# Patient Record
Sex: FEMALE | Race: WHITE | Employment: OTHER | URBAN - METROPOLITAN AREA
[De-identification: names, ages, dates, MRNs, and addresses within clinical notes are randomized per-mention and may not be internally consistent; named-entity substitution may affect disease eponyms.]

---

## 2017-01-16 ENCOUNTER — TRANSFERRED RECORDS (OUTPATIENT)
Dept: PHYSICAL THERAPY | Facility: CLINIC | Age: 67
End: 2017-01-16

## 2017-10-19 ENCOUNTER — TRANSFERRED RECORDS (OUTPATIENT)
Dept: PHYSICAL THERAPY | Facility: CLINIC | Age: 67
End: 2017-10-19

## 2017-11-06 ENCOUNTER — THERAPY VISIT (OUTPATIENT)
Dept: PHYSICAL THERAPY | Facility: CLINIC | Age: 67
End: 2017-11-06
Payer: COMMERCIAL

## 2017-11-06 DIAGNOSIS — N39.46 MIXED INCONTINENCE: ICD-10-CM

## 2017-11-06 DIAGNOSIS — M99.05 SOMATIC DYSFUNCTION OF PELVIS REGION: Primary | ICD-10-CM

## 2017-11-06 DIAGNOSIS — N95.2 ATROPHIC VAGINITIS: ICD-10-CM

## 2017-11-06 PROBLEM — N94.10: Status: ACTIVE | Noted: 2017-11-06

## 2017-11-06 PROBLEM — N94.10: Status: RESOLVED | Noted: 2017-11-06 | Resolved: 2017-11-06

## 2017-11-06 PROCEDURE — 97110 THERAPEUTIC EXERCISES: CPT | Mod: GP | Performed by: PHYSICAL THERAPIST

## 2017-11-06 PROCEDURE — 97535 SELF CARE MNGMENT TRAINING: CPT | Mod: GP | Performed by: PHYSICAL THERAPIST

## 2017-11-06 PROCEDURE — 97112 NEUROMUSCULAR REEDUCATION: CPT | Mod: GP | Performed by: PHYSICAL THERAPIST

## 2017-11-06 PROCEDURE — 97161 PT EVAL LOW COMPLEX 20 MIN: CPT | Mod: GP | Performed by: PHYSICAL THERAPIST
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
## 2017-11-06 NOTE — MR AVS SNAPSHOT
After Visit Summary   11/6/2017    Monica Edmonds    MRN: [id number]           Patient Information     Date Of Birth          1950        Visit Information        Provider Department      11/6/2017 8:50 AM Anat Cherry, PT [facility]        Today's Diagnoses     Mixed incontinence    -  1    Female dyspareunia due to non-psychogenic cause           Follow-ups after your visit        Your next 10 appointments already scheduled     Nov 13, 2017  9:30 AM CST   [facility] For Women Only with Anat Cherry, PT   [facility] ([facility] [place]  )    [address]  [facility] [address]   [phone]            Nov 20, 2017  9:30 AM CST   [facility] For Women Only with Anat Cherry, PT   [facility] ([facility] [place]  )    [address]  [facility] [address]   [phone]              Who to contact     If you have questions or need follow up information about today's clinic visit or your schedule please contact [facility] THERAP[facility] directly at [phone].  Normal or non-critical lab and imaging results will be communicated to you by Nusirthart, letter or phone within 4 business days after the clinic has received the results. If you do not hear from us within 7 days, please contact the clinic through Nusirthart or phone. If you have a critical or abnormal lab result, we will notify you by phone as soon as possible.  Submit refill requests through Beatsy or call your pharmacy and they will forward the refill request to us. Please allow 3 business days for your refill to be completed.          Additional Information About Your Visit        [employer] Information     Beatsy lets you send messages to your doctor, view your test results, renew your prescriptions, schedule appointments and more. To sign up, go to  [email or web address] . Click on \"Log in\" on the left side of the screen, which will take you to the Welcome page. Then click on \"Sign up Now\" on the right side of the page.     You will be asked to enter the access code listed below, as well as some personal information. Please follow the directions to create your username and password.     Your access code is: [email or web address]  Expires: 2018 11:32 AM     Your access code will  in 90 days. If you need help or a new code, please call your [place] clinic or [phone].        Care EveryWhere ID     This is your Care EveryWhere ID. This could be used by other organizations to access your [place] medical records  [id number]         Blood Pressure from Last 3 Encounters:   No data found for BP    Weight from Last 3 Encounters:   No data found for Wt              We Performed the Following     JUAN ALBERTO Inital Eval Report     Neuromuscular Re-Education     PT Eval, Low Complexity (32163)     Self Care Management Training     Therapeutic Exercises        Primary Care Provider Office Phone # Fax #    Rama Gauthier [phone]       [facility] [address] [facility] [address]        Equal Access to Services     ENZO JAEGER : Hadii aad ku hadasho Soomaali, waaxda luqadaha, qaybta kaalmada adeegyada, waxay idiin hayeladian aidan kraft. So [facility] [phone].    ATENCIÓN: Si habla español, tiene a miranda disposición servicios gratuitos de asistencia lingüística. Llame al [phone].    We comply with applicable federal civil rights laws and [place] laws. We do not discriminate on the basis of race, color, national origin, age, disability, sex, sexual orientation, or gender identity.            Thank you!     Thank you for choosing [facility]  for your care. Our goal is always to provide you with excellent care. Hearing back from our patients is one way we can continue " to improve our services. Please take a few minutes to complete the written survey that you may receive in the mail after your visit with us. Thank you!             Your Updated Medication List - Protect others around you: Learn how to safely use, store and throw away your medicines at www.disposemymeds.org.      Notice  As of 11/6/2017 11:32 AM    You have not been prescribed any medications.
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
## 2017-11-13 ENCOUNTER — THERAPY VISIT (OUTPATIENT)
Dept: PHYSICAL THERAPY | Facility: CLINIC | Age: 67
End: 2017-11-13
Payer: COMMERCIAL

## 2017-11-13 DIAGNOSIS — M99.05 SOMATIC DYSFUNCTION OF PELVIS REGION: ICD-10-CM

## 2017-11-13 DIAGNOSIS — N95.2 ATROPHIC VAGINITIS: ICD-10-CM

## 2017-11-13 DIAGNOSIS — N39.46 MIXED INCONTINENCE: ICD-10-CM

## 2017-11-13 PROCEDURE — 97535 SELF CARE MNGMENT TRAINING: CPT | Mod: GP | Performed by: PHYSICAL THERAPIST

## 2017-11-13 PROCEDURE — 97110 THERAPEUTIC EXERCISES: CPT | Mod: GP | Performed by: PHYSICAL THERAPIST

## 2017-11-13 PROCEDURE — 97112 NEUROMUSCULAR REEDUCATION: CPT | Mod: GP | Performed by: PHYSICAL THERAPIST

## 2017-11-20 ENCOUNTER — THERAPY VISIT (OUTPATIENT)
Dept: PHYSICAL THERAPY | Facility: CLINIC | Age: 67
End: 2017-11-20
Payer: COMMERCIAL

## 2017-11-20 DIAGNOSIS — N95.2 ATROPHIC VAGINITIS: ICD-10-CM

## 2017-11-20 DIAGNOSIS — M99.05 SOMATIC DYSFUNCTION OF PELVIS REGION: ICD-10-CM

## 2017-11-20 DIAGNOSIS — N39.46 MIXED INCONTINENCE: ICD-10-CM

## 2017-11-20 PROCEDURE — 97112 NEUROMUSCULAR REEDUCATION: CPT | Mod: GP | Performed by: PHYSICAL THERAPIST

## 2017-11-20 PROCEDURE — 97110 THERAPEUTIC EXERCISES: CPT | Mod: GP | Performed by: PHYSICAL THERAPIST

## 2017-12-15 ENCOUNTER — THERAPY VISIT (OUTPATIENT)
Dept: PHYSICAL THERAPY | Facility: CLINIC | Age: 67
End: 2017-12-15
Payer: COMMERCIAL

## 2017-12-15 DIAGNOSIS — M99.05 SOMATIC DYSFUNCTION OF PELVIS REGION: ICD-10-CM

## 2017-12-15 DIAGNOSIS — N95.2 ATROPHIC VAGINITIS: ICD-10-CM

## 2017-12-15 DIAGNOSIS — N39.46 MIXED INCONTINENCE: ICD-10-CM

## 2017-12-15 PROCEDURE — 97535 SELF CARE MNGMENT TRAINING: CPT | Mod: GP | Performed by: PHYSICAL THERAPIST

## 2017-12-15 PROCEDURE — 97112 NEUROMUSCULAR REEDUCATION: CPT | Mod: GP | Performed by: PHYSICAL THERAPIST

## 2017-12-15 PROCEDURE — 97110 THERAPEUTIC EXERCISES: CPT | Mod: GP | Performed by: PHYSICAL THERAPIST

## 2017-12-15 NOTE — PROGRESS NOTES
[facility] Evaluation  Subjective:    HPI                    Objective:    System    Physical Exam    General     ROS    Assessment/Plan:      DISCHARGE REPORT    Progress reporting period is from 11/6/2017 to 12/15/2017.       SUBJECTIVE  Subjective changes noted by patient:  .  Subjective: Overall less leaking.  Still has some leaking with sneezing.  [facility]  Going to a new gynocologist.  Has tried Estrase and felt tissue really improved a lot.  Tired intercourse and no bleeding.  Going to ask gynecologist how long she should be using this and if she can decrease the frequency and dose with concerns about the estrogen.  Overall she is happy though that she can have intercourse without bleeding.    Current pain level is  Current Pain level: 0/10.     Previous pain level was   Initial Pain level: 2/10.   Changes in function:  Yes (See Goal flowsheet attached for changes in current functional level)  Adverse reaction to treatment or activity: None    OBJECTIVE  Changes noted in objective findings:  Yes,   Objective: Will continue with use of the dilator as needed.  Added knack for strengthening pelvic floor with cough or sneeze.  Discussed which strengtheing to focus on with pelvic floor [facility] and then items to focus on at home if she does not go to class.  Daily continue with 3 sets of 5 reps for isolated pelvic floor strengthening.       ASSESSMENT/PLAN  Updated problem list and treatment plan: Diagnosis 1:  incontinence  Decreased strength - therapeutic exercise, therapeutic activities and home program  Impaired muscle performance - biofeedback, neuro re-education and home program  Decreased function - therapeutic activities and home program  Diagnosis 2:  dyspareunia   Pain -  manual therapy, self management, education and home program  Decreased ROM/flexibility - manual therapy, therapeutic exercise and home  program  Decreased function - therapeutic activities and home program  STG/LTGs have been met or progress has been made towards goals:  Yes (See Goal flow sheet completed today.)  Assessment of Progress: The patient's condition is improving.  The patient's condition has potential to improve.  Self Management Plans:  Patient has been instructed in a home treatment program.    Monica continues to require the following intervention to meet STG and LTG's:  Patient needs to continue to work on the home exercise program.      Recommendations:  This patient is ready to be discharged from therapy and continue their home treatment program.    Please refer to the daily flowsheet for treatment today, total treatment time and time spent performing 1:1 timed codes.

## 2017-12-15 NOTE — MR AVS SNAPSHOT
"              After Visit Summary   12/15/2017    Monica Edmonds    MRN: [id number]           Patient Information     Date Of Birth          1950        Visit Information        Provider Department      12/15/2017 8:50 AM Anat Cherry, PT [facility] Athletic [facility] Physical [facility]        Today's Diagnoses     Atrophic vaginitis        Somatic dysfunction of pelvis region        Mixed incontinence           Follow-ups after your visit        Who to contact     If you have questions or need follow up information about today's clinic visit or your schedule please contact [facility] ATHLETIC [facility] PHYSICAL [facility] directly at [phone].  Normal or non-critical lab and imaging results will be communicated to you by [employer], letter or phone within 4 business days after the clinic has received the results. If you do not hear from us within 7 days, please contact the clinic through [employer] or phone. If you have a critical or abnormal lab result, we will notify you by phone as soon as possible.  Submit refill requests through Vocab or call your pharmacy and they will forward the refill request to us. Please allow 3 business days for your refill to be completed.          Additional Information About Your Visit        MyChart Information     Vocab lets you send messages to your doctor, view your test results, renew your prescriptions, schedule appointments and more. To sign up, go to [email or web address] . Click on \"Log in\" on the left side of the screen, which will take you to the Welcome page. Then click on \"Sign up Now\" on the right side of the page.     You will be asked to enter the access code listed below, as well as some personal information. Please follow the directions to create your username and password.     Your access code is: [email or web address]  Expires: 2018 11:32 AM     Your access code will  in 90 days. If you need help or a new code, please call your " [facility] or [phone].        Care EveryWhere ID     This is your Care EveryWhere ID. This could be used by other organizations to access your [place] medical records  [id number]         Blood Pressure from Last 3 Encounters:   No data found for BP    Weight from Last 3 Encounters:   No data found for Wt              We Performed the Following     [facility] PROGRESS NOTES REPORT     NEUROMUSCULAR RE-EDUCATION     SELF CARE MNGMENT TRAINING     THERAPEUTIC EXERCISES        Primary Care Provider Office Phone # Fax #    Rama Gauthier [phone]       [facility] [address]  [place] [address]        Equal Access to Services     [facility]: Hadii aad ku hadasho Soomaali, waaxda luqadaha, qaybta kaalmada adeegyada, waxay idiin hayaan adeeg lizzie schmidt . So [facility] [phone].    ATENCIÓN: Si habla español, tiene a miranda disposición servicios gratuitos de asistencia lingüística. Llame al [phone].    We comply with applicable federal civil rights laws and [place] laws. We do not discriminate on the basis of race, color, national origin, age, disability, sex, sexual orientation, or gender identity.            Thank you!     Thank you for choosing [facility] PHYSICAL THERAP[facility]  for your care. Our goal is always to provide you with excellent care. Hearing back from our patients is one way we can continue to improve our services. Please take a few minutes to complete the written survey that you may receive in the mail after your visit with us. Thank you!             Your Updated Medication List - Protect others around you: Learn how to safely use, store and throw away your medicines at www.disposemymeds.org.      Notice  As of 12/15/2017 10:09 AM    You have not been prescribed any medications.

## 2017-12-15 NOTE — LETTER
[facility]  [address]  [place] [address] [phone]    2017    Re: Monica Edmonds   :   1950  MRN:  [id number]   REFERRING PHYSICIAN:   Rama Gauthier    [facility]    Date of Initial Evaluation:  2017  Visits:  Rxs Used: 4  Reason for Referral:     Atrophic vaginitis  Somatic dysfunction of pelvis region  Mixed incontinence    DISCHARGE REPORT    Progress reporting period is from 2017 to 12/15/2017.       SUBJECTIVE  Subjective changes noted by patient:  .  Subjective: Overall less leaking.  Still has some leaking with sneezing.  Has been going [facility]  Going to a new gynocologist.  Has tried Estrase and felt tissue really improved a lot.  Tired intercourse and no bleeding.  Going to ask gynecologist how long she should be using this and if she can decrease the frequency and dose with concerns about the estrogen.  Overall she is happy though that she can have intercourse without bleeding.    Current pain level is  Current Pain level: 0/10.     Previous pain level was   Initial Pain level: 2/10.   Changes in function:  Yes (See Goal flowsheet attached for changes in current functional level)  Adverse reaction to treatment or activity: None    OBJECTIVE  Changes noted in objective findings:  Yes,   Objective: Will continue with use of the dilator as needed.  Added knack for strengthening pelvic floor with cough or sneeze.  Discussed which strengtheing to focus on with pelvic floor [facility] and then items to focus on at home if she does not go to class.  Daily continue with 3 sets of 5 reps for isolated pelvic floor strengthening.       ASSESSMENT/PLAN  Updated problem list and treatment plan: Diagnosis 1:  incontinence  Decreased strength - therapeutic exercise, therapeutic activities and home program  Impaired  muscle performance - biofeedback, neuro re-education and home program  Decreased function - therapeutic activities and home program  Diagnosis 2:  dyspareunia   Pain -  manual therapy, self management, education and home program  Decreased ROM/flexibility - manual therapy, therapeutic exercise and home program  Decreased function - therapeutic activities and home program  STG/LTGs have been met or progress has been made towards goals:  Yes (See Goal flow sheet completed today.)  Assessment of Progress: The patient's condition is improving.  The patient's condition has potential to improve.  Self Management Plans:  Patient has been instructed in a home treatment program.  Monica continues to require the following intervention to meet STG and LTG's:  Patient needs to continue to work on the home exercise program.      Recommendations:  This patient is ready to be discharged from therapy and continue their home treatment program.              Thank you for your referral.    INQUIRIES  Therapist: Anat Cherry, PT, ATCR, [facility]   [facility] PHYSICAL THERAPY  [address] [place] [address] [address]  [facility] [address]  Phone: [phone]  Fax: [phone]

## 2018-04-12 ENCOUNTER — THERAPY VISIT (OUTPATIENT)
Dept: PHYSICAL THERAPY | Facility: CLINIC | Age: 68
End: 2018-04-12
Payer: COMMERCIAL

## 2018-04-12 DIAGNOSIS — M54.42 ACUTE BILATERAL LOW BACK PAIN WITH LEFT-SIDED SCIATICA: Primary | ICD-10-CM

## 2018-04-12 DIAGNOSIS — M25.551 HIP PAIN, RIGHT: ICD-10-CM

## 2018-04-12 PROCEDURE — 97110 THERAPEUTIC EXERCISES: CPT | Mod: GP | Performed by: PHYSICAL THERAPIST

## 2018-04-12 PROCEDURE — 97161 PT EVAL LOW COMPLEX 20 MIN: CPT | Mod: GP | Performed by: PHYSICAL THERAPIST

## 2018-04-12 NOTE — PROGRESS NOTES
[facility] Initial Evaluation  Subjective:  Patient is a 68 year old female presenting with rehab left hip hpi.   Monica Edmonds is a 68 year old female with a bilateral hips condition.  Condition occurred with:  Other reason (rolling over in bed).  Condition occurred: for unknown reasons.  This is a new condition  Pt has had these sx 2x before, but this time feels different because the pain started 4 weeks ago and was radiating across the top of L hip, however pain is located mostly in the R anterior thigh but also numbness is occurring on the L toes. .    Patient reports pain:  Other (back).  Radiates to:  Thigh.    and reported as 5/10.  Associated symptoms:  Numbness (L toes). Pain is the same all the time.   and relieved by heat.                                                        Objective:  System    Ankle/Foot Evaluation  ROM:        Strength:    Dorsiflexion:  Left: 4+/5     Pain:   Right: 4-/5   Pain:  Plantarflexion: Left: 4+/5   Pain:   Right: 3+/5  Pain:      Flexion Great Toe:Left: 4+/5  Pain:  Right: 3+/5   Pain:                               Lumbar/SI Evaluation  ROM:    AROM Lumbar:   Flexion:         Painful and limited (pt reported more limited than 4 weeks ago)  Ext:                      Side Bend:        Left:     Right:   Rotation:           Left:  Limited    Right:  Limited  Side Glide:        Left:     Right:               Cord Signs:  Cord signs lumbar: Femoral nerve glide (-)                                                  Hip Evaluation  Hip PROM:    Flexion: Left: limited   Right: limited                                         General     ROS    Assessment/Plan:    Patient is a 68 year old female with lumbar and left side hip complaints.    Patient has the following significant findings with corresponding treatment plan.                Diagnosis 1:  Bilateral hip pain  Pain -  hot/cold therapy and manual therapy  Decreased ROM/flexibility - manual therapy and  therapeutic exercise  Impaired muscle performance - neuro re-education  Decreased function - therapeutic activities    Therapy Evaluation Codes:   1) History comprised of:   Personal factors that impact the plan of care:      None.    Comorbidity factors that impact the plan of care are:      None.     Medications impacting care: None.  2) Examination of Body Systems comprised of:   Body structures and functions that impact the plan of care:      Hip, Lumbar spine and Sacral illiac joint.   Activity limitations that impact the plan of care are:      Bending and Laying down.  3) Clinical presentation characteristics are:   Stable/Uncomplicated.  4) Decision-Making    Low complexity using standardized patient assessment instrument and/or measureable assessment of functional outcome.  Cumulative Therapy Evaluation is: Low complexity.    Previous and current functional limitations:  (See Goal Flow Sheet for this information)    Short term and Long term goals: (See Goal Flow Sheet for this information)     Communication ability:  Patient appears to be able to clearly communicate and understand verbal and written communication and follow directions correctly.  Treatment Explanation - The following has been discussed with the patient:   RX ordered/plan of care  Anticipated outcomes  Possible risks and side effects  This patient would benefit from PT intervention to resume normal activities.   Rehab potential is good.    Frequency:  1 X week, once daily  Duration:  for 6 weeks  Discharge Plan:  Achieve all LTG.  Independent in home treatment program.  Reach maximal therapeutic benefit.    Please refer to the daily flowsheet for treatment today, total treatment time and time spent performing 1:1 timed codes.

## 2018-04-12 NOTE — MR AVS SNAPSHOT
"              After Visit Summary   4/12/2018    Monica Edmonds    MRN: [id number]           Patient Information     Date Of Birth          1950        Visit Information        Provider Department      4/12/2018 5:10 PM Anat Cherry, PT [facility] Athletic [facility] Physical Therapy         Follow-ups after your visit        Your next 10 appointments already scheduled     Apr 12, 2018  5:10 PM CDT   (Arrive by 4:55 PM)   [facility] Spine with Anat Cherry PT   [facility] Athletic [facility] Physical Therapy ([facility] [place]  )    [address]  [place] [address] [phone]              Who to contact     If you have questions or need follow up information about today's clinic visit or your schedule please contact [facility] ATHLETIC [facility] PHYSICAL THERAPY directly at [phone].  Normal or non-critical lab and imaging results will be communicated to you by MyChart, letter or phone within 4 business days after the clinic has received the results. If you do not hear from us within 7 days, please contact the clinic through [employer] or phone. If you have a critical or abnormal lab result, we will notify you by phone as soon as possible.  Submit refill requests through [employer] or call your pharmacy and they will forward the refill request to us. Please allow 3 business days for your refill to be completed.          Additional Information About Your Visit        [employer] Information     Cerora lets you send messages to your doctor, view your test results, renew your prescriptions, schedule appointments and more. To sign up, go to [email or web address] . Click on \"Log in\" on the left side of the screen, which will take you to the Welcome page. Then click on \"Sign up Now\" on the right side of the page.     You will be asked to enter the access code listed below, as well as some personal information. Please follow the directions to create your username and " password.     Your access code is: [email or web address]  Expires: 2018  1:50 PM     Your access code will  in 90 days. If you need help or a new code, please call your [place] clinic or [phone].        Care EveryWhere ID     This is your Care EveryWhere ID. This could be used by other organizations to access your [place] medical records  [id number]         Blood Pressure from Last 3 Encounters:   No data found for BP    Weight from Last 3 Encounters:   No data found for Wt              Today, you had the following     No orders found for display       Primary Care Provider Office Phone # Fax #    Rama Gauthier [phone]       [facility] [address]  [place] [address]        Equal Access to Services     [facility] : Hadii aad ku hadasho Soomaali, waaxda luqadaha, qaybta kaalmada adeegyada, monae gee haylien schmidt . So [facility] [phone].    ATENCIÓN: Si habla español, tiene a miranda disposición servicios gratuitos de asistencia lingüística. Rochelle al [phone].    We comply with applicable federal civil rights laws and [place] laws. We do not discriminate on the basis of race, color, national origin, age, disability, sex, sexual orientation, or gender identity.            Thank you!     Thank you for choosing [facility] PHYSICAL THERAPY  for your care. Our goal is always to provide you with excellent care. Hearing back from our patients is one way we can continue to improve our services. Please take a few minutes to complete the written survey that you may receive in the mail after your visit with us. Thank you!             Your Updated Medication List - Protect others around you: Learn how to safely use, store and throw away your medicines at www.disposemymeds.org.      Notice  As of 2018  1:50 PM    You have not been prescribed any medications.

## 2018-04-12 NOTE — LETTER
[facility] ATHLETIC [facility] PHYSICAL [facility]  [address]  [place] [address] [phone]    2018    Re: Monica Edmonds   :   1950  MRN:  [id number]   REFERRING PHYSICIAN:  CHIVO HARDIN DO     [facility] ATHLETIC [facility] PHYSICAL [facility]    Date of Initial Evaluation:  2018  Visits:  Rxs Used: 1  Reason for Referral:     Acute bilateral low back pain with left-sided sciatica  Hip pain, right    EVALUATION SUMMARY    [facility]  Subjective:  Patient is a 68 year old female presenting with rehab left hip hpi.   Monica Edmonds is a 68 year old female with a bilateral hips condition.  Condition occurred with:  Other reason (rolling over in bed).  Condition occurred: for unknown reasons.  This is a new condition  Pt has had these sx 2x before, but this time feels different because the pain started 4 weeks ago and was radiating across the top of L hip, however pain is located mostly in the R anterior thigh but also numbness is occurring on the L toes. .    Patient reports pain:  Other (back).  Radiates to:  Thigh.    and reported as 5/10.  Associated symptoms:  Numbness (L toes). Pain is the same all the time.   and relieved by heat.   Pertinent medical history includes:  Cancer, asthma and other (numbness/tingling, pain at night/rest, calf pain, swelling).  Medical allergies: yes (neosporin).  Other surgeries include:  Cancer surgery (left breast lumectomy and lymph node).  Current medications:  Anti-inflammatory, steroids and other (tamoxifen and vitamins/calcium, aleve and advair).  Current occupation is Retired  Primary job tasks include:  Other (A mix of sitting, standing, computer work, house and yard work).  Barriers include:  None as reported by patient.  Red flags:  None as reported by patient.  Oswestry Score: 30 %                     Objective:  System  Ankle/Foot Evaluation  ROM:     Strength:    Dorsiflexion:  Left: 4+/5     Pain:   Right: 4-/5   Pain:  Plantarflexion: Left: 4+/5   Pain:   Right: 3+/5  Pain:  Flexion Great Toe:Left: 4+/5  Pain:  Right: 3+/5   Pain:    Lumbar/SI Evaluation  ROM:    AROM Lumbar:   Flexion:         Painful and limited (pt reported more limited than 4 weeks ago)  Ext:                      Side Bend:        Left:     Right:   Rotation:           Left:  Limited    Right:  Limited  Side Glide:        Left:     Right:   Cord Signs:  Cord signs lumbar: Femoral nerve glide (-)  Hip Evaluation  Hip PROM:    Flexion: Left: limited   Right: limited  Assessment/Plan:    Patient is a 68 year old female with lumbar and left side hip complaints.    Patient has the following significant findings with corresponding treatment plan.                Diagnosis 1:  Bilateral hip pain  Pain -  hot/cold therapy and manual therapy  Decreased ROM/flexibility - manual therapy and therapeutic exercise  Impaired muscle performance - neuro re-education  Decreased function - therapeutic activities  Therapy Evaluation Codes:   1) History comprised of:   Personal factors that impact the plan of care:      None.    Comorbidity factors that impact the plan of care are:      None.     Medications impacting care: None.  2) Examination of Body Systems comprised of:   Body structures and functions that impact the plan of care:      Hip, Lumbar spine and Sacral illiac joint.   Activity limitations that impact the plan of care are:      Bending and Laying down.  3) Clinical presentation characteristics are:   Stable/Uncomplicated.  4) Decision-Making    Low complexity using standardized patient assessment instrument and/or measureable assessment of functional outcome.  Cumulative Therapy Evaluation is: Low complexity.  Previous and current functional limitations:  (See Goal Flow Sheet for this information)    Short term and Long term goals: (See Goal Flow Sheet for this information)   Communication  ability:  Patient appears to be able to clearly communicate and understand verbal and written communication and follow directions correctly.  Treatment Explanation - The following has been discussed with the patient:   RX ordered/plan of care  Anticipated outcomes  Possible risks and side effects  This patient would benefit from PT intervention to resume normal activities.   Rehab potential is good.    Frequency:  1 X week, once daily  Duration:  for 6 weeks  Discharge Plan:  Achieve all LTG.  Independent in home treatment program.  Reach maximal therapeutic benefit.      Thank you for your referral.    INQUIRIES  Therapist: Anat Cherry, PT, ATCR, [facility]  [facility] PHYSICAL THERAPY  [address] [place] [address] [address]  [facility] [address]  Phone: [phone]  Fax: [phone]

## 2018-04-13 NOTE — PROGRESS NOTES
[place] [facility] Initial Evaluation  Subjective:  Patient is a 68 year old female presenting with rehab left ankle/foot hpi.                                      Pertinent medical history includes:  Cancer, asthma and other (numbness/tingling, pain at night/rest, calf pain, swelling).  Medical allergies: yes (neosporin).  Other surgeries include:  Cancer surgery (left breast lumectomy and lymph node).  Current medications:  Anti-inflammatory, steroids and other (tamoxifen and vitamins/calcium, aleve and advair).  Current occupation is Retired  .    Primary job tasks include:  Other (A mix of sitting, standing, computer work, house and yard work).    Barriers include:  None as reported by patient.    Red flags:  None as reported by patient.      Oswestry Score: 30 %                 Objective:  System    Physical Exam    General     ROS    Assessment/Plan:

## 2018-04-19 ENCOUNTER — THERAPY VISIT (OUTPATIENT)
Dept: PHYSICAL THERAPY | Facility: CLINIC | Age: 68
End: 2018-04-19
Payer: COMMERCIAL

## 2018-04-19 DIAGNOSIS — M25.551 HIP PAIN, RIGHT: ICD-10-CM

## 2018-04-19 DIAGNOSIS — M54.42 ACUTE BILATERAL LOW BACK PAIN WITH LEFT-SIDED SCIATICA: ICD-10-CM

## 2018-04-19 PROCEDURE — 97110 THERAPEUTIC EXERCISES: CPT | Mod: GP | Performed by: PHYSICAL THERAPIST

## 2018-04-19 PROCEDURE — 97112 NEUROMUSCULAR REEDUCATION: CPT | Mod: GP | Performed by: PHYSICAL THERAPIST

## 2018-04-19 NOTE — MR AVS SNAPSHOT
"              After Visit Summary   2018    Monica Edmonds    MRN: [id number]           Patient Information     Date Of Birth          1950        Visit Information        Provider Department      2018 12:40 PM Anat Cherry PT [facility] Athletic [facility] Physical [facility]        Today's Diagnoses     Hip pain, right        Acute bilateral low back pain with left-sided sciatica           Follow-ups after your visit        Who to contact     If you have questions or need follow up information about today's clinic visit or your schedule please contact [facility] ATHLETIC [facility] PHYSICAL [facility] directly at [phone].  Normal or non-critical lab and imaging results will be communicated to you by MyChart, letter or phone within 4 business days after the clinic has received the results. If you do not hear from us within 7 days, please contact the clinic through Billogramhart or phone. If you have a critical or abnormal lab result, we will notify you by phone as soon as possible.  Submit refill requests through [email or web address] or call your pharmacy and they will forward the refill request to us. Please allow 3 business days for your refill to be completed.          Additional Information About Your Visit        MyChart Information     [email or web address] lets you send messages to your doctor, view your test results, renew your prescriptions, schedule appointments and more. To sign up, go to [email or web address] . Click on \"Log in\" on the left side of the screen, which will take you to the Welcome page. Then click on \"Sign up Now\" on the right side of the page.     You will be asked to enter the access code listed below, as well as some personal information. Please follow the directions to create your username and password.     Your access code is: [email or web address]  Expires: 2018  1:50 PM     Your access code will  in 90 days. If you need help or a new code, please call your [facility] clinic " or [phone].        Care EveryWhere ID     This is your Care EveryWhere ID. This could be used by other organizations to access your [place] medical records  [id number]         Blood Pressure from Last 3 Encounters:   No data found for BP    Weight from Last 3 Encounters:   No data found for Wt              We Performed the Following     NEUROMUSCULAR RE-EDUCATION     THERAPEUTIC EXERCISES        Primary Care Provider Office Phone # Fax #    Rama Gauthier [phone]       [facility] [address]  [place] [address]        Equal Access to Services     [facility]: Hadii aad ku hadasho Soomaali, waaxda luqadaha, qaybta kaalmada adeegyada, waxfely kerrin hayaan adeeg lizzie schmidt . So [facility] [phone].    ATENCIÓN: Si habla español, tiene a miranda disposición servicios gratuitos de asistencia lingüística. [facility] [phone].    We comply with applicable federal civil rights laws and [place] laws. We do not discriminate on the basis of race, color, national origin, age, disability, sex, sexual orientation, or gender identity.            Thank you!     Thank you for choosing [facility] PHYSICAL THERAPY  for your care. Our goal is always to provide you with excellent care. Hearing back from our patients is one way we can continue to improve our services. Please take a few minutes to complete the written survey that you may receive in the mail after your visit with us. Thank you!             Your Updated Medication List - Protect others around you: Learn how to safely use, store and throw away your medicines at www.disposemymeds.org.      Notice  As of 4/19/2018  1:19 PM    You have not been prescribed any medications.

## 2018-04-26 ENCOUNTER — THERAPY VISIT (OUTPATIENT)
Dept: PHYSICAL THERAPY | Facility: CLINIC | Age: 68
End: 2018-04-26
Payer: COMMERCIAL

## 2018-04-26 DIAGNOSIS — M54.42 ACUTE BILATERAL LOW BACK PAIN WITH LEFT-SIDED SCIATICA: ICD-10-CM

## 2018-04-26 DIAGNOSIS — M25.551 HIP PAIN, RIGHT: ICD-10-CM

## 2018-04-26 PROCEDURE — 97110 THERAPEUTIC EXERCISES: CPT | Mod: GP | Performed by: PHYSICAL THERAPIST

## 2018-04-26 PROCEDURE — 97112 NEUROMUSCULAR REEDUCATION: CPT | Mod: GP | Performed by: PHYSICAL THERAPIST

## 2018-05-03 ENCOUNTER — THERAPY VISIT (OUTPATIENT)
Dept: PHYSICAL THERAPY | Facility: CLINIC | Age: 68
End: 2018-05-03
Payer: COMMERCIAL

## 2018-05-03 DIAGNOSIS — M54.42 ACUTE BILATERAL LOW BACK PAIN WITH LEFT-SIDED SCIATICA: ICD-10-CM

## 2018-05-03 DIAGNOSIS — M25.551 HIP PAIN, RIGHT: ICD-10-CM

## 2018-05-03 PROCEDURE — 97112 NEUROMUSCULAR REEDUCATION: CPT | Mod: GP | Performed by: PHYSICAL THERAPIST

## 2018-05-03 PROCEDURE — 97110 THERAPEUTIC EXERCISES: CPT | Mod: GP | Performed by: PHYSICAL THERAPIST

## 2018-05-09 ENCOUNTER — THERAPY VISIT (OUTPATIENT)
Dept: PHYSICAL THERAPY | Facility: CLINIC | Age: 68
End: 2018-05-09
Payer: COMMERCIAL

## 2018-05-09 DIAGNOSIS — M25.551 HIP PAIN, RIGHT: ICD-10-CM

## 2018-05-09 DIAGNOSIS — M54.42 ACUTE BILATERAL LOW BACK PAIN WITH LEFT-SIDED SCIATICA: ICD-10-CM

## 2018-05-09 PROCEDURE — 97110 THERAPEUTIC EXERCISES: CPT | Mod: GP | Performed by: PHYSICAL THERAPIST

## 2018-05-09 PROCEDURE — 97112 NEUROMUSCULAR REEDUCATION: CPT | Mod: GP | Performed by: PHYSICAL THERAPIST

## 2018-05-17 ENCOUNTER — THERAPY VISIT (OUTPATIENT)
Dept: PHYSICAL THERAPY | Facility: CLINIC | Age: 68
End: 2018-05-17
Payer: COMMERCIAL

## 2018-05-17 DIAGNOSIS — M54.42 ACUTE BILATERAL LOW BACK PAIN WITH LEFT-SIDED SCIATICA: ICD-10-CM

## 2018-05-17 DIAGNOSIS — M25.551 HIP PAIN, RIGHT: ICD-10-CM

## 2018-05-17 PROCEDURE — 97112 NEUROMUSCULAR REEDUCATION: CPT | Mod: GP | Performed by: PHYSICAL THERAPIST

## 2018-05-17 PROCEDURE — 97110 THERAPEUTIC EXERCISES: CPT | Mod: GP | Performed by: PHYSICAL THERAPIST

## 2018-06-04 ENCOUNTER — THERAPY VISIT (OUTPATIENT)
Dept: PHYSICAL THERAPY | Facility: CLINIC | Age: 68
End: 2018-06-04
Payer: COMMERCIAL

## 2018-06-04 DIAGNOSIS — M25.551 HIP PAIN, RIGHT: ICD-10-CM

## 2018-06-04 DIAGNOSIS — M54.42 ACUTE BILATERAL LOW BACK PAIN WITH LEFT-SIDED SCIATICA: ICD-10-CM

## 2018-06-04 PROCEDURE — 97110 THERAPEUTIC EXERCISES: CPT | Mod: GP | Performed by: PHYSICAL THERAPIST

## 2018-06-04 PROCEDURE — 97535 SELF CARE MNGMENT TRAINING: CPT | Mod: GP | Performed by: PHYSICAL THERAPIST

## 2018-06-04 NOTE — MR AVS SNAPSHOT
After Visit Summary   6/4/2018    Monica Edmonds    MRN: [id number]           Patient Information     Date Of Birth          1950        Visit Information        Provider Department      6/4/2018 1:20 PM Anat Cherry PT [facility] Athletic [facility] Physical Therapy        Today's Diagnoses     Hip pain, right        Acute bilateral low back pain with left-sided sciatica           Follow-ups after your visit        Who to contact     If you have questions or need follow up information about today's clinic visit or your schedule please contact [facility] ATHLETIC [facility] PHYSICAL [facility] directly at [phone].  Normal or non-critical lab and imaging results will be communicated to you by MyChart, letter or phone within 4 business days after the clinic has received the results. If you do not hear from us within 7 days, please contact the clinic through MyChart or phone. If you have a critical or abnormal lab result, we will notify you by phone as soon as possible.  Submit refill requests through TipRanks or call your pharmacy and they will forward the refill request to us. Please allow 3 business days for your refill to be completed.          Additional Information About Your Visit        Care EveryWhere ID     This is your Care EveryWhere ID. This could be used by other organizations to access your [place] medical records  [id number]         Blood Pressure from Last 3 Encounters:   No data found for BP    Weight from Last 3 Encounters:   No data found for Wt              We Performed the Following     JUAN ALBERTO PROGRESS NOTES REPORT     SELF CARE MNGMENT TRAINING     THERAPEUTIC EXERCISES        Primary Care Provider Office Phone # Fax #    Rama Gauthier [phone]       [facility] [address]  [place] [address]        Equal Access to Services     ENZO JAEGER AH: Hadii lakesha German qaybta  monae schaffer lizzie schmidt ah. So [facility] [phone].    ATENCIÓN: Si habla gera, tiene a miranda disposición servicios gratuitos de asistencia lingüística. Llame al [phone].    We comply with applicable federal civil rights laws and [place] laws. We do not discriminate on the basis of race, color, national origin, age, disability, sex, sexual orientation, or gender identity.            Thank you!     Thank you for choosing [place] [facility] PHYSICAL THERAPY  for your care. Our goal is always to provide you with excellent care. Hearing back from our patients is one way we can continue to improve our services. Please take a few minutes to complete the written survey that you may receive in the mail after your visit with us. Thank you!             Your Updated Medication List - Protect others around you: Learn how to safely use, store and throw away your medicines at www.disposemymeds.org.      Notice  As of 6/4/2018  1:54 PM    You have not been prescribed any medications.

## 2018-06-04 NOTE — PROGRESS NOTES
"Subjective:  HPI                    Objective:  System    Physical Exam    General     ROS    Assessment/Plan:    PROGRESS  REPORT    Progress reporting period is from 4/12/18 to 6/4/18.       SUBJECTIVE  Subjective changes noted by patient: .  Subjective: Pt returned from long trip with c/o hip not \"clicking\" into place when she stands up from long periods but was able to walk 3-7 miles a day. Was able to walk in hiking off woods and was fine. Feels she puts more weight through R side.     Current pain level is 0/10  .     Previous pain level was  5/10 Initial Pain level: 5/10.   Changes in function:  Yes (See Goal flowsheet attached for changes in current functional level)  Adverse reaction to treatment or activity: None    OBJECTIVE  Changes noted in objective findings:  Yes,  Objective: R side JADA + and FADIR -. Stairs are no longer painful. Discussed hip flexor stretch when hip feels like it's catching. Discussed returning to yoga and purchasing a new bike.         ASSESSMENT/PLAN  Updated problem list and treatment plan: Diagnosis 1:  R hip pain  Pain -  hot/cold therapy and manual therapy  Decreased ROM/flexibility - manual therapy and therapeutic exercise  Decreased joint mobility - manual therapy and therapeutic exercise  Decreased strength - therapeutic exercise and therapeutic activities  Impaired gait - gait training  Impaired muscle performance - neuro re-education  Decreased function - therapeutic activities  STG/LTGs have been met or progress has been made towards goals:  Yes (See Goal flow sheet completed today.)  Assessment of Progress: The patient's condition is improving.  Self Management Plans:  Patient has been instructed in a home treatment program.  I have re-evaluated this patient and find that the nature, scope, duration and intensity of the therapy is appropriate for the medical condition of the patient.  Monica continues to require the following intervention to meet STG and LTG's:  " PT    Recommendations:  This patient would benefit from continued therapy.     Frequency:  1 X week, once daily  Duration:  for 6 weeks        Please refer to the daily flowsheet for treatment today, total treatment time and time spent performing 1:1 timed codes.

## 2018-06-04 NOTE — LETTER
"[facility] ATHLETIC [facility] PHYSICAL [facility]  [address]  [place] [id number] [phone]    2018    Re: Monica Edmonds   :   1950  MRN:  [id number]   REFERRING PHYSICIAN:   [facility]    Date of Initial Evaluation:  2018  Visits:  Rxs Used: 7  Reason for Referral:     Hip pain, right  Acute bilateral low back pain with left-sided sciatica    Assessment/Plan:    PROGRESS  REPORT    Progress reporting period is from 18 to 18.       SUBJECTIVE  Subjective changes noted by patient: .  Subjective: Pt returned from long trip with c/o hip not \"clicking\" into place when she stands up from long periods but was able to walk 3-7 miles a day. Was able to walk in hiking off woods and was fine. Feels she puts more weight through R side.     Current pain level is 0/10  .     Previous pain level was  5/10 Initial Pain level: 5/10.   Changes in function:  Yes (See Goal flowsheet attached for changes in current functional level)  Adverse reaction to treatment or activity: None    OBJECTIVE  Changes noted in objective findings:  Yes,  Objective: R side JADA + and FADIR -. Stairs are no longer painful. Discussed hip flexor stretch when hip feels like it's catching. Discussed returning to yoga and purchasing a new bike.         ASSESSMENT/PLAN  Updated problem list and treatment plan: Diagnosis 1:  R hip pain  Pain -  hot/cold therapy and manual therapy  Decreased ROM/flexibility - manual therapy and therapeutic exercise  Decreased joint mobility - manual therapy and therapeutic exercise  Decreased strength - therapeutic exercise and therapeutic activities  Impaired gait - gait training  Impaired muscle performance - neuro re-education  Decreased function - therapeutic activities  STG/LTGs have been met or progress has been made towards goals:  Yes (See Goal flow sheet completed " today.)  Assessment of Progress: The patient's condition is improving.  Self Management Plans:  Patient has been instructed in a home treatment program.  I have re-evaluated this patient and find that the nature, scope, duration and intensity of the therapy is appropriate for the medical condition of the patient.  Monica continues to require the following intervention to meet STG and LTG's:  PT    Recommendations:  This patient would benefit from continued therapy.     Frequency:  1 X week, once daily  Duration:  for 6 weeks            Thank you for your referral.    INQUIRIES  Therapist: Anat Cherry, PT, ATCR, [facility]  [facility] PHYSICAL THERAPY  [address]  [place] [address]  Phone: [phone]  Fax: [phone]

## 2018-07-02 ENCOUNTER — THERAPY VISIT (OUTPATIENT)
Dept: PHYSICAL THERAPY | Facility: CLINIC | Age: 68
End: 2018-07-02
Payer: COMMERCIAL

## 2018-07-02 DIAGNOSIS — M25.551 HIP PAIN, RIGHT: ICD-10-CM

## 2018-07-02 DIAGNOSIS — M54.42 ACUTE BILATERAL LOW BACK PAIN WITH LEFT-SIDED SCIATICA: ICD-10-CM

## 2018-07-02 PROCEDURE — 97140 MANUAL THERAPY 1/> REGIONS: CPT | Mod: GP | Performed by: PHYSICAL THERAPIST

## 2018-07-02 PROCEDURE — 97110 THERAPEUTIC EXERCISES: CPT | Mod: GP | Performed by: PHYSICAL THERAPIST

## 2018-07-26 ENCOUNTER — THERAPY VISIT (OUTPATIENT)
Dept: PHYSICAL THERAPY | Facility: CLINIC | Age: 68
End: 2018-07-26
Payer: COMMERCIAL

## 2018-07-26 DIAGNOSIS — M25.551 HIP PAIN, RIGHT: ICD-10-CM

## 2018-07-26 DIAGNOSIS — M54.42 ACUTE BILATERAL LOW BACK PAIN WITH LEFT-SIDED SCIATICA: ICD-10-CM

## 2018-07-26 PROCEDURE — 97110 THERAPEUTIC EXERCISES: CPT | Mod: GP | Performed by: PHYSICAL THERAPIST

## 2018-07-26 NOTE — MR AVS SNAPSHOT
After Visit Summary   7/26/2018    Monica Edmonds    MRN: [id number]           Patient Information     Date Of Birth          1950        Visit Information        Provider Department      7/26/2018 12:00 PM Anat Cherry PT [facility] Athletic [facility] Physical Therapy        Today's Diagnoses     Hip pain, right        Acute bilateral low back pain with left-sided sciatica           Follow-ups after your visit        Who to contact     If you have questions or need follow up information about today's clinic visit or your schedule please contact [facility] ATHLETIC [facility] PHYSICAL [facility] directly at [phone].  Normal or non-critical lab and imaging results will be communicated to you by MyChart, letter or phone within 4 business days after the clinic has received the results. If you do not hear from us within 7 days, please contact the clinic through MyChart or phone. If you have a critical or abnormal lab result, we will notify you by phone as soon as possible.  Submit refill requests through LaComunity or call your pharmacy and they will forward the refill request to us. Please allow 3 business days for your refill to be completed.          Additional Information About Your Visit        Care EveryWhere ID     This is your Care EveryWhere ID. This could be used by other organizations to access your [place] medical records  [id number]         Blood Pressure from Last 3 Encounters:   No data found for BP    Weight from Last 3 Encounters:   No data found for Wt              We Performed the Following     JUAN ALBERTO PROGRESS NOTES REPORT     THERAPEUTIC EXERCISES        Primary Care Provider Office Phone # Fax #    Rama Gauthier [phone]       [facility] [address]  [place] [address]        Equal Access to Services     ENZO JAEGER AH: Hadii wade Dias, lakesha tubbs, qaybmonae warner  primo talacharly lucindamaria luz kraft. So [facility] [phone].    ATENCIÓN: Si habla gera, tiene a miranda disposición servicios gratuitos de asistencia lingüística. Bridgetteame al [phone].    We comply with applicable federal civil rights laws and [place] laws. We do not discriminate on the basis of race, color, national origin, age, disability, sex, sexual orientation, or gender identity.            Thank you!     Thank you for choosing [place] [facility] PHYSICAL THERAPY  for your care. Our goal is always to provide you with excellent care. Hearing back from our patients is one way we can continue to improve our services. Please take a few minutes to complete the written survey that you may receive in the mail after your visit with us. Thank you!             Your Updated Medication List - Protect others around you: Learn how to safely use, store and throw away your medicines at www.disposemymeds.org.      Notice  As of 7/26/2018  1:03 PM    You have not been prescribed any medications.

## 2018-07-26 NOTE — PROGRESS NOTES
Subjective:  HPI                    Objective:  System    Physical Exam    General     ROS    Assessment/Plan:    DISCHARGE REPORT    Progress reporting period is from 6/4/2018 to 7/26/2018.       SUBJECTIVE  Subjective changes noted by patient:  .  Subjective: Did well walking 6 miles while on vacation.  Had mild back pain with sitting in car.      Current pain level is  Current Pain level: 1/10.     Previous pain level was   Initial Pain level: 5/10.   Changes in function:  Yes (See Goal flowsheet attached for changes in current functional level)  Adverse reaction to treatment or activity: None    OBJECTIVE  Changes noted in objective findings:  Yes,   Objective: Discussed using a lumbar pillow..  Modified exercises to focus on strengtheing to be done on days she does not do yoga.       ASSESSMENT/PLAN  Updated problem list and treatment plan: Diagnosis 1:  Hip pain  Pain -  manual therapy, self management, education and home program  Decreased ROM/flexibility - manual therapy, therapeutic exercise and home program  Decreased strength - therapeutic exercise, therapeutic activities and home program  Decreased function - therapeutic activities and home program  STG/LTGs have been met or progress has been made towards goals:  Yes (See Goal flow sheet completed today.)  Assessment of Progress: The patient's condition is improving.  The patient's condition has potential to improve.  Self Management Plans:  Patient has been instructed in a home treatment program.    Monica continues to require the following intervention to meet STG and LTG's:  Patient needs to continue to work on the home exercise program.      Recommendations:  This patient is ready to be discharged from therapy and continue their home treatment program.    Please refer to the daily flowsheet for treatment today, total treatment time and time spent performing 1:1 timed codes.

## 2018-07-26 NOTE — LETTER
[facility] ATHLETIC [facility] PHYSICAL [facility]  [address]  [place] [address] [phone]    2018    Re: Monica Edmonds   :   1950  MRN:  [id number]   REFERRING PHYSICIAN:   Bernice Wilkes [facility] ATHLETIC [facility]    Date of Initial Evaluation:  2018  Visits:  Rxs Used: 9  Reason for Referral:     Hip pain, right  Acute bilateral low back pain with left-sided sciatica    DISCHARGE REPORT    Progress reporting period is from 2018 to 2018.       SUBJECTIVE  Subjective changes noted by patient:  .  Subjective: Did well walking 6 miles while on vacation.  Had mild back pain with sitting in car.      Current pain level is  Current Pain level: 1/10.     Previous pain level was   Initial Pain level: 5/10.   Changes in function:  Yes (See Goal flowsheet attached for changes in current functional level)  Adverse reaction to treatment or activity: None    OBJECTIVE  Changes noted in objective findings:  Yes,   Objective: Discussed using a lumbar pillow..  Modified exercises to focus on strengtheing to be done on days she does not do yoga.       ASSESSMENT/PLAN  Updated problem list and treatment plan: Diagnosis 1:  Hip pain  Pain -  manual therapy, self management, education and home program  Decreased ROM/flexibility - manual therapy, therapeutic exercise and home program  Decreased strength - therapeutic exercise, therapeutic activities and home program  Decreased function - therapeutic activities and home program  STG/LTGs have been met or progress has been made towards goals:  Yes (See Goal flow sheet completed today.)  Assessment of Progress: The patient's condition is improving.  The patient's condition has potential to improve.  Self Management Plans:  Patient has been instructed in a home treatment program.    Monica continues to require the following intervention to meet STG and LTG's:  Patient needs to  continue to work on the home exercise program.      Recommendations:  This patient is ready to be discharged from therapy and continue their home treatment program.      Thank you for your referral.    INQUIRIES  Therapist: Anat Cherry PT, ATCR, [facility] A[facility]IC [facility]ICINE [facility] PHYSICAL THERAPY  [address] [place] [address] [address]  [place] [address]  Phone: [phone]  Fax: [phone]

## 2020-03-16 ENCOUNTER — THERAPY VISIT (OUTPATIENT)
Dept: PHYSICAL THERAPY | Facility: CLINIC | Age: 70
End: 2020-03-16
Payer: MEDICARE

## 2020-03-16 DIAGNOSIS — M25.562 KNEE PAIN, LEFT: ICD-10-CM

## 2020-03-16 PROCEDURE — 97161 PT EVAL LOW COMPLEX 20 MIN: CPT | Mod: GP | Performed by: PHYSICAL THERAPIST

## 2020-03-16 PROCEDURE — 97112 NEUROMUSCULAR REEDUCATION: CPT | Mod: GP | Performed by: PHYSICAL THERAPIST

## 2020-03-16 PROCEDURE — 97110 THERAPEUTIC EXERCISES: CPT | Mod: GP | Performed by: PHYSICAL THERAPIST

## 2020-03-16 ASSESSMENT — ACTIVITIES OF DAILY LIVING (ADL)
SQUAT: ACTIVITY IS SOMEWHAT DIFFICULT
RAW_SCORE: 40
LIMPING: THE SYMPTOM AFFECTS MY ACTIVITY SLIGHTLY
STAND: ACTIVITY IS SOMEWHAT DIFFICULT
RISE FROM A CHAIR: ACTIVITY IS MINIMALLY DIFFICULT
WEAKNESS: THE SYMPTOM AFFECTS MY ACTIVITY SLIGHTLY
SIT WITH YOUR KNEE BENT: ACTIVITY IS SOMEWHAT DIFFICULT
PAIN: THE SYMPTOM AFFECTS MY ACTIVITY MODERATELY
GO DOWN STAIRS: ACTIVITY IS FAIRLY DIFFICULT
GIVING WAY, BUCKLING OR SHIFTING OF KNEE: I DO NOT HAVE THE SYMPTOM
AS_A_RESULT_OF_YOUR_KNEE_INJURY,_HOW_WOULD_YOU_RATE_YOUR_CURRENT_LEVEL_OF_DAILY_ACTIVITY?: ABNORMAL
WALK: ACTIVITY IS SOMEWHAT DIFFICULT
KNEE_ACTIVITY_OF_DAILY_LIVING_SCORE: 57.14
HOW_WOULD_YOU_RATE_THE_CURRENT_FUNCTION_OF_YOUR_KNEE_DURING_YOUR_USUAL_DAILY_ACTIVITIES_ON_A_SCALE_FROM_0_TO_100_WITH_100_BEING_YOUR_LEVEL_OF_KNEE_FUNCTION_PRIOR_TO_YOUR_INJURY_AND_0_BEING_THE_INABILITY_TO_PERFORM_ANY_OF_YOUR_USUAL_DAILY_ACTIVITIES?: 68
SWELLING: THE SYMPTOM AFFECTS MY ACTIVITY MODERATELY
HOW_WOULD_YOU_RATE_THE_OVERALL_FUNCTION_OF_YOUR_KNEE_DURING_YOUR_USUAL_DAILY_ACTIVITIES?: ABNORMAL
KNEE_ACTIVITY_OF_DAILY_LIVING_SUM: 40
GO UP STAIRS: ACTIVITY IS MINIMALLY DIFFICULT
STIFFNESS: THE SYMPTOM AFFECTS MY ACTIVITY MODERATELY
KNEEL ON THE FRONT OF YOUR KNEE: ACTIVITY IS VERY DIFFICULT

## 2020-03-16 NOTE — PROGRESS NOTES
[facility]  Subjective:  The history is provided by the patient. No  was used.   Therapist Generated HPI Evaluation  Problem details: Insidious onset of left greater than right knee pain this winter.  Patient saw MD for this on 3/9/2020. Fell on the right knee in the garage in January.  Has noticed swelling in the left knee.  Has had acupuncture.   Has been wearing a brace.  Has been using walking sticks if needed.  [facility]  Yoga is canceled.  Hurts to kneel.  X-rays showed mild DJD.  No catching.  .         Type of problem:  Bilateral knees.    This is a new condition.  Condition occurred with:  Insidious onset.  Where condition occurred: at home and for unknown reasons.  Patient reports pain:  Anterior.  Pain is described as aching and is intermittent.  Pain radiates to:  Lower leg.   Since onset symptoms are gradually improving.  Associated symptoms:  Edema and loss of strength. Symptoms are exacerbated by descending stairs  and relieved by bracing/immobilizing and ice (acupuncture).  Special tests included:  X-ray.    Work activity restrictions: retired.                          Objective:  System                                                Knee Evaluation:  ROM:      PROM    Hyperextension: Left:   Right:  1  Extension: Left: 8    Right:  0  Flexion: Left: 125    Right:  135        Ligament Testing:  Normal                Special Tests: Special test for knee: 7 inch step up.  Left knee positive for the following special tests:  Meniscal and Asterisk Sign    Palpation:    Left knee tenderness present at:  Medial Joint Line; Lateral Joint Line and Popliteal    Edema:    Circumference:      Joint Line:  Left:  36.8   Right:  36.6    Mobility Testing:  Normal                  General     ROS    Assessment/Plan:    Patient is a 70 year old female with left side knee complaints.    Patient has the following significant findings with  corresponding treatment plan.                Diagnosis 1:  Left knee pain  Pain -  manual therapy, self management, education and home program  Decreased ROM/flexibility - manual therapy, therapeutic exercise and home program  Decreased strength - therapeutic exercise, therapeutic activities and home program  Impaired muscle performance - neuro re-education and home program  Decreased function - therapeutic activities and home program    Therapy Evaluation Codes:   1) History comprised of:   Personal factors that impact the plan of care:      Time since onset of symptoms.    Comorbidity factors that impact the plan of care are:      None.     Medications impacting care: None.  2) Examination of Body Systems comprised of:   Body structures and functions that impact the plan of care:      Knee.   Activity limitations that impact the plan of care are:      Squatting/kneeling and Stairs.  3) Clinical presentation characteristics are:   Stable/Uncomplicated.  4) Decision-Making    Low complexity using standardized patient assessment instrument and/or measureable assessment of functional outcome.  Cumulative Therapy Evaluation is: Low complexity.    Previous and current functional limitations:  (See Goal Flow Sheet for this information)    Short term and Long term goals: (See Goal Flow Sheet for this information)     Communication ability:  Patient appears to be able to clearly communicate and understand verbal and written communication and follow directions correctly.  Treatment Explanation - The following has been discussed with the patient:   RX ordered/plan of care  Anticipated outcomes  Possible risks and side effects  This patient would benefit from PT intervention to resume normal activities.   Rehab potential is excellent.    Frequency:  1 X a month, once daily  Duration:  for 3 months  Discharge Plan:  Achieve all LTG.  Independent in home treatment program.  Reach maximal therapeutic benefit.    Please refer to  the daily flowsheet for treatment today, total treatment time and time spent performing 1:1 timed codes.

## 2020-03-16 NOTE — LETTER
DEPARTMENT OF HEALTH AND HUMAN SERVICES  CENTERS FOR MEDICARE & MEDICAID SERVICES    PLAN/UPDATED PLAN OF PROGRESS FOR OUTPATIENT REHABILITATION    PATIENTS NAME:  Monica Edmonds   : 1950  PROVIDER NUMBER:    [id number]  HICN: [id number]   PROVIDER NAME: [facility] PHYSICAL THERAPY  MEDICAL RECORD NUMBER: [id number]   START OF CARE DATE:  SOC Date: 20   TYPE:  PT  PRIMARY/TREATMENT DIAGNOSIS: (Pertinent Medical Diagnosis)  Knee pain, left    VISITS FROM START OF CARE:  Rxs Used: 1     [place] [facility] Initial Evaluation  Subjective:  The history is provided by the patient. No  was used.       Therapist Generated HPI Evaluation  Problem details: Insidious onset of left greater than right knee pain this winter.  Patient saw MD for this on 3/9/2020. Fell on the right knee in the garage in January.  Has noticed swelling in the left knee.  Has had acupuncture.   Has been wearing a brace.  Has been using walking sticks if needed.  [facility]  Y[facility] is canceled.  Hurts to kneel.  X-rays showed mild DJD.  No catching.  .         Type of problem:  Bilateral knees.  This is a new condition.  Condition occurred with:  Insidious onset.  Where condition occurred: at home and for unknown reasons.  Patient reports pain:  Anterior.  Pain is described as aching and is intermittent.  Pain radiates to:  Lower leg.   Since onset symptoms are gradually improving.  Associated symptoms:  Edema and loss of strength. Symptoms are exacerbated by descending stairs  and relieved by bracing/immobilizing and ice (acupuncture).  Special tests included:  X-ray.  Work activity restrictions: retired.  General health as reported by patient is good.  Pertinent medical history includes: asthma, cancer and numbness/tingling.   Red flags:  None as reported by patient.  Medical allergies: other. Other medical allergies details: medications.   Surgeries include:   Cancer surgery (breast).    Current medications:  Anti-inflammatory, steroids and other (aspirin, asthma meds, zyrtec, vitamins).    Current occupation is retired.   Primary job tasks include:  Computer work, prolonged sitting and prolonged standing.   Other job/home tasks details: cleaning, gardening, cooking.              Knee Activity of Daily Living Score: 57.14              PATIENTS NAME:  Monica Edmonds   : 1950      Objective:  System  Knee Evaluation:  ROM:    PROM  Hyperextension: Left:   Right:  1  Extension: Left: 8    Right:  0  Flexion: Left: 125    Right:  135  Ligament Testing:  Normal  Special Tests: Special test for knee: 7 inch step up.  Left knee positive for the following special tests:  Meniscal and Asterisk Sign  Palpation:    Left knee tenderness present at:  Medial Joint Line; Lateral Joint Line and Popliteal    Edema:    Circumference:  Joint Line:  Left:  36.8   Right:  36.6  Mobility Testing:  Normal  Assessment/Plan:    Patient is a 70 year old female with left side knee complaints.    Patient has the following significant findings with corresponding treatment plan.                Diagnosis 1:  Left knee pain  Pain -  manual therapy, self management, education and home program  Decreased ROM/flexibility - manual therapy, therapeutic exercise and home program  Decreased strength - therapeutic exercise, therapeutic activities and home program  Impaired muscle performance - neuro re-education and home program  Decreased function - therapeutic activities and home program  Therapy Evaluation Codes:   1) History comprised of:   Personal factors that impact the plan of care:      Time since onset of symptoms.    Comorbidity factors that impact the plan of care are:      None.     Medications impacting care: None.  2) Examination of Body Systems comprised of:   Body structures and functions that impact the plan of care:      Knee.   Activity limitations that impact the plan of care are:   "    Squatting/kneeling and Stairs.  3) Clinical presentation characteristics are:   Stable/Uncomplicated.  4) Decision-Making    Low complexity using standardized patient assessment instrument and/or measureable assessment of functional outcome.  Cumulative Therapy Evaluation is: Low complexity.  Previous and current functional limitations:  (See Goal Flow Sheet for this information)    Short term and Long term goals: (See Goal Flow Sheet for this information)   Communication ability:  Patient appears to be able to clearly communicate and understand verbal and written communication and follow directions correctly.  Treatment Explanation - The following has been discussed with the patient:    PATIENTS NAME:  Monica Edmonds   : 1950         RX ordered/plan of care  Anticipated outcomes  Possible risks and side effects  This patient would benefit from PT intervention to resume normal activities.   Rehab potential is excellent.  Frequency:  1 X a month, once daily  Duration:  for 3 months  Discharge Plan:  Achieve all LTG.  Independent in home treatment program.  Reach maximal therapeutic benefit.         Caregiver Signature/Credentials _____________________________ Date ________       Treating Provider: Anat MccartyATC   I have reviewed and certified the need for these services and plan of treatment while under my care.        PHYSICIAN'S SIGNATURE:   _________________________________________  Date___________   Rosita Wetzel MD  Certification period:  Beginning of Cert date period: 20 to  End of Cert period date: 20     Functional Level Progress Report: Please see attached \"Goal Flow sheet for Functional level.\"    ____X____ Continue Services or       ________ DC Services                Service dates: From  SOC Date: 20 date to present                         "

## 2020-03-17 NOTE — PROGRESS NOTES
[facility] Initial Evaluation  Subjective:    Patient Health History           General health as reported by patient is good.  Pertinent medical history includes: asthma, cancer and numbness/tingling.   Red flags:  None as reported by patient.  Medical allergies: other. Other medical allergies details: medications.   Surgeries include:  Cancer surgery (breast).    Current medications:  Anti-inflammatory, steroids and other (aspirin, asthma meds, zyrtec, vitamins).    Current occupation is retired.   Primary job tasks include:  Computer work, prolonged sitting and prolonged standing.   Other job/home tasks details: cleaning, gardening, cooking.                     Knee Activity of Daily Living Score: 57.14            Objective:  System    Physical Exam    General     ROS    Assessment/Plan:

## 2020-04-03 ENCOUNTER — TELEPHONE (OUTPATIENT)
Dept: PHYSICAL THERAPY | Facility: CLINIC | Age: 70
End: 2020-04-03

## 2020-04-03 NOTE — TELEPHONE ENCOUNTER
Pt and PT discussed PT options secondary to Covid. Pt informed of choices regarding PT including virtual phone or video visits. Pt would like to pursue video PT follow up visits with provider, Anat Cherry. This PT scheduled patient for a follow-up visit.

## 2020-04-16 ENCOUNTER — VIRTUAL VISIT (OUTPATIENT)
Dept: PHYSICAL THERAPY | Facility: CLINIC | Age: 70
End: 2020-04-16
Payer: MEDICARE

## 2020-04-16 DIAGNOSIS — M25.562 KNEE PAIN, LEFT: ICD-10-CM

## 2020-04-16 PROCEDURE — 97110 THERAPEUTIC EXERCISES: CPT | Mod: 95 | Performed by: PHYSICAL THERAPIST

## 2020-04-16 PROCEDURE — 97112 NEUROMUSCULAR REEDUCATION: CPT | Mod: 95 | Performed by: PHYSICAL THERAPIST

## 2020-04-16 NOTE — PROGRESS NOTES
"Physical Therapy Virtual Follow Up Visit      The patient has been notified of following:     \"This virtual visit will be conducted between you and your provider. We have found that certain health care needs can be provided without the need for physical presence.  This service lets us provide the care you need with a virtual visit.\"    Due to external, as well as internal [facility] management of the COVID-19 Virus, Monica Edmonds was not seen in our clinic.  As a substitution, we implemented a virtual visit to manage this patient's condition utilizing the PTRx virtual visit platform via the patient s existing code.  The provider, Anat Cherry, reviewed the patient's chart, PTRx prescription, and spoke with the patient to determine the following telemedicine visit is appropriate and effective for the patient's care.    The following type of visit was completed:   Video Visit:  The PTRx platform uses a synchronous HIPAA compliant video stream for this patient encounter.        S: Monica Edmonds is a 70 year old female. Connected virtually on the PTRx platform to discuss their condition/progress. They noted improvements in her range of motion and strength.  Now able to straighten the knee and bend more freely as well. Able to go up and down stairs without pain most of the time.  Has walked outdoors up to two miles and has ridden her bike up to 6 miles.  Has not done much walking on uneven surfaces.    .  They noted ongoing pain or limitations with stiffness at times after sitting.  Some pain in the posterior knee.   .     Current pain level: 1/10      O: Patient demonstrated full extension of the knee in standing.  Exercises were progressed for  Strengthening including lunges to help with return to gardening and kneeling on the knee as well.  Add balance for uneven surfaces    PTRx Content from today's visit:  Exercise Name: Balance Single Leg Stance Supported and Unsupported, Sets: 1 - Reps: 3, Notes: goal " 30 sec with eyes open and then closed  Exercise Name: Functional Lunge Backward, Sets: 1 - Reps: 3-5, Notes: to mimic getting up from the ground with gardening  Exercise Name: All 4s Stretch Neutral Spine, Sets: 1 - Reps: 3, Notes: do this on your bed as needed for kneeling stretch  Exercise Name: Passive Knee Extension Stretch, Notes: 1-2 minutes, 3 times a week  Exercise Name: Short Arc Knee Extension, Sets: 1 - Reps: 5-10 - Sessions: 1, Notes: after sitting  Exercise Name: Supine Heel Slides, Sets: 1 - Reps: 10 - Sessions: 1  Exercise Name: Standing Gastroc Stretch, Sets: 1 - Reps: 2-3, Notes: hold 30 sec  Exercise Name: Bridging #1, Sets: 1 - Reps: 10, Notes: hold 5 sec  Exercise Name: Sit to Stand, Sets: 1 - Reps: 10 - Sessions: 1  Exercise Name: Education Sheet General, Notes: To schedule:  [phone]  Continue with light massage    A:   Patient's symptoms are resolving.  Patient is progressing as expected.  Response to therapy has shown an improvement in  pain level, ROM , strength and function      P: Patient will continue with the exercise program assigned on their PTRx code and will add the following measures to manage their pain/condition: progress strengthening exercises     Current treatment program is being advanced to more complex exercises.      Virtual visit contact time    Time of service began: 11:00 AM  Time of service ended: 11:33 AM  Total Time for set up, visit, and documentation: 42 minutes    Payor: MEDICARE / Plan: MEDICARE / Product Type: Medicare /   .  Procedure Code/s   Therapeutic Exercise (59350): 15 minutes  Neuromuscular Re-education (47719): 10 minutes    I have reviewed the note as documented above.  This accurately captures the substance of my conversation with the patient.  Provider location: [facility] ([facility]State)  Patient location: home

## 2020-04-22 PROBLEM — M25.562 KNEE PAIN, LEFT: Status: RESOLVED | Noted: 2020-03-16 | Resolved: 2020-04-22

## 2022-03-17 ENCOUNTER — TRANSCRIBE ORDERS (OUTPATIENT)
Dept: OTHER | Age: 72
End: 2022-03-17
Payer: MEDICARE

## 2022-03-17 DIAGNOSIS — M47.816 SPONDYLOSIS OF LUMBAR REGION WITHOUT MYELOPATHY OR RADICULOPATHY: Primary | ICD-10-CM

## 2022-03-17 DIAGNOSIS — M16.11 PRIMARY OSTEOARTHRITIS OF RIGHT HIP: ICD-10-CM

## 2022-03-17 DIAGNOSIS — M43.16 ANTEROLISTHESIS OF LUMBAR SPINE: ICD-10-CM

## 2022-03-25 ENCOUNTER — THERAPY VISIT (OUTPATIENT)
Dept: PHYSICAL THERAPY | Facility: CLINIC | Age: 72
End: 2022-03-25
Payer: MEDICARE

## 2022-03-25 DIAGNOSIS — M54.42 BILATERAL LOW BACK PAIN WITH LEFT-SIDED SCIATICA, UNSPECIFIED CHRONICITY: Primary | ICD-10-CM

## 2022-03-25 PROCEDURE — 97161 PT EVAL LOW COMPLEX 20 MIN: CPT | Mod: GP | Performed by: PHYSICAL THERAPIST

## 2022-03-25 PROCEDURE — 97110 THERAPEUTIC EXERCISES: CPT | Mod: GP | Performed by: PHYSICAL THERAPIST

## 2022-03-25 NOTE — PROGRESS NOTES
[facility]    OUTPATIENT Physical Therapy ORTHOPEDIC EVALUATION  PLAN OF TREATMENT FOR OUTPATIENT REHABILITATION  (COMPLETE FOR INITIAL CLAIMS ONLY)  Patient's Last Name, First Name, M.I.  YOB: 1950  Monica Edmonds    Provider s Name:  [facility]   Medical Record No.  [id number]   Start of Care Date:  03/25/22   Onset Date:    (3/16/2022, patient saw MD for back pain)   Type:     _X__PT   ___OT Medical Diagnosis:    Encounter Diagnosis   Name Primary?     Bilateral low back pain with left-sided sciatica, unspecified chronicity Yes        Treatment Diagnosis:  back pain        Goals:     03/25/22 0500   Body Part   Goals listed below are for back   Goal #1   Goal #1 sitting   Previous Functional Level No restrictions   Current Functional Level Minutes patient can sit   Performance level 30 before onset of back and left leg pain   STG Target Performance Minutes patient will be able to sit   Performance level 30 wtih no c/o leg pain   Rationale to allow rest from standing;for community transportation  (computer work at home)   Due date 04/25/22   LTG Target Performance Minutes patient will be able to sit   Performance Level 60 without c/o leg or back pain   Rationale to allow rest from standing;for community transportation   Due date 05/25/22       Therapy Frequency:  once a week  Predicted Duration of Therapy Intervention:  6 weeks    Anat Cherry, PT                 I CERTIFY THE NEED FOR THESE SERVICES FURNISHED UNDER        THIS PLAN OF TREATMENT AND WHILE UNDER MY CARE .             Physician Signature               Date    X_____________________________________________________                             Certification Date From:  03/25/22   Certification Date To:  06/22/22    Referring Provider:  Rosita Wetzel    Initial Assessment        See Epic  Evaluation SOC Date: 03/25/22

## 2022-03-25 NOTE — PROGRESS NOTES
Physical Therapy Initial Evaluation  Subjective:  The history is provided by the patient. No  was used.   Therapist Generated HPI Evaluation  Problem details: Patient saw MD for back pain on 3/16/2022.  Had x ray imaging: No lumbar spine fracture is identified.  There is mild multilevel degenerative disc change. Slight narrowing of the L2-3, L3-4 and L4-5 disc spaces. There is 4 mm of degenerative anterolisthesis of L4 on L5.  Facet arthropathy evident at the lower two  lumbar levels.   Pain is worse over the last 6 weeks.  Activity level has been decreased with several MOHS procedures over the last few months.  Also had cataract surgery.  Has been trying some of the exercises.  Pain in the right hip/gluteal, back and left hip/gluteal.  More sore in the evening.  Has been doing some weight lifting with #3.  Has been riding stationary bike 20-30 minutes.  Walking is OK.  Trying to walk 30 minutes or up to 3 miles. Has intermittent left leg pain.  Worse with sitting.  Has some osteopenia.  Feels cracking in low back.  Feels muscle tension after level.  .         Type of problem:  Lumbar.    This is a recurrent condition.  Condition occurred with:  Insidious onset.  Where condition occurred: at home.  Patient reports pain:  Lumbar spine left and lumbar spine right.  Pain is described as aching and burning and is intermittent.  Pain radiates to:  Lower leg left and foot left. Pain is worse in the P.M..  Since onset symptoms are gradually worsening.  Associated symptoms:  Tingling. Symptoms are exacerbated by certain positions and sitting  and relieved by rest and NSAID's.  Special tests included:  X-ray.    Barriers include:  None as reported by patient.                        Objective:  System         Lumbar/SI Evaluation  ROM:    AROM Lumbar:   Flexion:          Hands below knees, throbbing left gluteal  Ext:                    No pain    Side Bend:        Left:  No increase in pain, stiffness  reported    Right:  No increase in pain, stiffness reported  Rotation:           Left:     Right:   Side Glide:        Left:     Right:         Strength: left leg pain with all 4's back extension  Lumbar Myotomes:  normal            Lumbar DTR's:  not assessed      Cord Signs:  not assessed    Lumbar Dermtomes:  normal                Neural Tension/Mobility:    Left side:  SLR w/DF and Slump positive.     Right side:   SLR w/DF or Slump  negative.   Lumbar Palpation:    Tenderness present at Left:    Erector Spinae  Tenderness present at Right: Erector Spinae  Functional Tests:  not assessed        Lumbar Provocation:    Left positive with:  Stork w/ext and PROM hip  Right positive with: PROM hip  Spinal Segmental Conclusions:     Level: Hypo noted at T10 and T11  Level:  Hyper noted at L4                                                 General     ROS    Assessment/Plan:    Patient is a 72 year old female with lumbar complaints.    Patient has the following significant findings with corresponding treatment plan.                Diagnosis 1:  Back pain  Pain -  manual therapy, self management, education, directional preference exercise and home program  Decreased ROM/flexibility - manual therapy, therapeutic exercise and home program  Decreased joint mobility - manual therapy, therapeutic exercise and home program  Decreased strength - therapeutic exercise, therapeutic activities and home program  Decreased function - therapeutic activities and home program    Therapy Evaluation Codes:   1) History comprised of:   Personal factors that impact the plan of care:      Time since onset of symptoms.    Comorbidity factors that impact the plan of care are:      Osteoarthritis and Overweight.     Medications impacting care: None.  2) Examination of Body Systems comprised of:   Body structures and functions that impact the plan of care:      Lumbar spine.   Activity limitations that impact the plan of care are:      Bending,  Cooking, Driving, Sitting and Sports.  3) Clinical presentation characteristics are:   Stable/Uncomplicated.  4) Decision-Making    Low complexity using standardized patient assessment instrument and/or measureable assessment of functional outcome.  Cumulative Therapy Evaluation is: Low complexity.    Previous and current functional limitations:  (See Goal Flow Sheet for this information)    Short term and Long term goals: (See Goal Flow Sheet for this information)     Communication ability:  Patient appears to be able to clearly communicate and understand verbal and written communication and follow directions correctly.  Treatment Explanation - The following has been discussed with the patient:   RX ordered/plan of care  Anticipated outcomes  Possible risks and side effects  This patient would benefit from PT intervention to resume normal activities.   Rehab potential is excellent.    Frequency:  1 X week, once daily  Duration:  for 6 weeks  Discharge Plan:  Achieve all LTG.  Independent in home treatment program.  Reach maximal therapeutic benefit.    Please refer to the daily flowsheet for treatment today, total treatment time and time spent performing 1:1 timed codes.

## 2022-03-25 NOTE — PROGRESS NOTES
Physical Therapy Initial Evaluation  Subjective:    Patient Health History           General health as reported by patient is good.  Pertinent medical history includes: asthma, cancer, concussions/dizziness, implanted device, incontinence, menopausal, numbness/tingling and osteoarthritis.   Red flags:  Pain at rest/night.     Surgeries include:  Cancer surgery. Other surgery history details: lumpectomy, mohs, mole removed.    Current medications:  Anti-inflammatory and pain medication. Other medications details: albuterol, multivit, calcium + D , zyrtec, Biotin, advair.    Current occupation is retired.   Primary job tasks include:  Computer work, driving, prolonged sitting and prolonged standing.                  Oswestry Score: 28 %                 Objective:  System    Physical Exam    General     ROS    Assessment/Plan:

## 2022-04-14 ENCOUNTER — THERAPY VISIT (OUTPATIENT)
Dept: PHYSICAL THERAPY | Facility: CLINIC | Age: 72
End: 2022-04-14
Attending: FAMILY MEDICINE
Payer: MEDICARE

## 2022-04-14 DIAGNOSIS — M54.42 BILATERAL LOW BACK PAIN WITH LEFT-SIDED SCIATICA: Primary | ICD-10-CM

## 2022-04-14 DIAGNOSIS — M16.11 PRIMARY OSTEOARTHRITIS OF RIGHT HIP: ICD-10-CM

## 2022-04-14 DIAGNOSIS — M43.16 ANTEROLISTHESIS OF LUMBAR SPINE: ICD-10-CM

## 2022-04-14 DIAGNOSIS — M47.816 SPONDYLOSIS OF LUMBAR REGION WITHOUT MYELOPATHY OR RADICULOPATHY: ICD-10-CM

## 2022-04-14 PROCEDURE — 97530 THERAPEUTIC ACTIVITIES: CPT | Mod: GP | Performed by: PHYSICAL THERAPIST

## 2022-04-14 PROCEDURE — 97110 THERAPEUTIC EXERCISES: CPT | Mod: GP | Performed by: PHYSICAL THERAPIST

## 2022-04-21 ENCOUNTER — THERAPY VISIT (OUTPATIENT)
Dept: PHYSICAL THERAPY | Facility: CLINIC | Age: 72
End: 2022-04-21
Attending: FAMILY MEDICINE
Payer: MEDICARE

## 2022-04-21 DIAGNOSIS — M54.42 BILATERAL LOW BACK PAIN WITH LEFT-SIDED SCIATICA: Primary | ICD-10-CM

## 2022-04-21 PROCEDURE — 97110 THERAPEUTIC EXERCISES: CPT | Mod: GP | Performed by: PHYSICAL THERAPIST

## 2022-04-28 ENCOUNTER — THERAPY VISIT (OUTPATIENT)
Dept: PHYSICAL THERAPY | Facility: CLINIC | Age: 72
End: 2022-04-28
Attending: FAMILY MEDICINE
Payer: MEDICARE

## 2022-04-28 DIAGNOSIS — M54.42 BILATERAL LOW BACK PAIN WITH LEFT-SIDED SCIATICA: Primary | ICD-10-CM

## 2022-04-28 PROCEDURE — 97110 THERAPEUTIC EXERCISES: CPT | Mod: GP | Performed by: PHYSICAL THERAPIST

## 2022-05-26 ENCOUNTER — THERAPY VISIT (OUTPATIENT)
Dept: PHYSICAL THERAPY | Facility: CLINIC | Age: 72
End: 2022-05-26
Payer: MEDICARE

## 2022-05-26 DIAGNOSIS — M54.42 BILATERAL LOW BACK PAIN WITH LEFT-SIDED SCIATICA, UNSPECIFIED CHRONICITY: Primary | ICD-10-CM

## 2022-05-26 PROCEDURE — 97110 THERAPEUTIC EXERCISES: CPT | Mod: GP | Performed by: PHYSICAL THERAPIST

## 2022-05-26 PROCEDURE — 97140 MANUAL THERAPY 1/> REGIONS: CPT | Mod: GP | Performed by: PHYSICAL THERAPIST

## 2022-07-01 ENCOUNTER — THERAPY VISIT (OUTPATIENT)
Dept: PHYSICAL THERAPY | Facility: CLINIC | Age: 72
End: 2022-07-01
Payer: MEDICARE

## 2022-07-01 DIAGNOSIS — M54.42 BILATERAL LOW BACK PAIN WITH LEFT-SIDED SCIATICA: Primary | ICD-10-CM

## 2022-07-01 PROCEDURE — 97110 THERAPEUTIC EXERCISES: CPT | Mod: GP | Performed by: PHYSICAL THERAPIST

## 2022-07-01 NOTE — PROGRESS NOTES
[facility]    OUTPATIENT Physical Therapy ORTHOPEDIC EVALUATION  PLAN OF TREATMENT FOR OUTPATIENT REHABILITATION  (COMPLETE FOR INITIAL CLAIMS ONLY)  Patient's Last Name, First Name, M.I.  YOB: 1950  Monica Edmonds    Provider s Name:  [facility]   Medical Record No.  [id number]   Start of Care Date:  03/25/22   Onset Date:    (3/16/2022, patient saw MD for back pain)   Type:     _X__PT   ___OT Medical Diagnosis:    Encounter Diagnosis   Name Primary?     Bilateral low back pain with left-sided sciatica Yes        Treatment Diagnosis:  back pain        Goals:                                                                             [facility]    OUTPATIENT Physical Therapy ORTHOPEDIC EVALUATION  PLAN OF TREATMENT FOR OUTPATIENT REHABILITATION  (COMPLETE FOR INITIAL CLAIMS ONLY)  Patient's Last Name, First Name, M.I.  YOB: 1950  Monica Edmonds    Provider s Name:  [facility]   Medical Record No.  [id number]   Start of Care Date:  03/25/22   Onset Date:    (3/16/2022, patient saw MD for back pain)   Type:     _X__PT   ___OT Medical Diagnosis:    Encounter Diagnosis   Name Primary?     Bilateral low back pain with left-sided sciatica Yes        Treatment Diagnosis:  back pain        Goals:   07/01/22 0500   Body Part   Goals listed below are for back   Goal #1   Goal #1 sitting   Previous Functional Level No restrictions   Current Functional Level Minutes patient can sit   Performance level 30 before onset of back and left leg pain   STG Target Performance Minutes patient will be able to sit   Performance level 30 with no c/o leg pain   Rationale to allow rest from standing;for community transportation  (computer work at home)   Due date 04/25/22   Date  Goal Met 04/14/22   LTGS Target Performance Minutes patient will be able to sit   Performance Level 60 without c/o leg or back pain   Rationale to allow rest from standing;for community transportation   Due date 05/25/22   Date Goal Met 05/26/22   Goal #2   Goal #2 standing   Previous Functional Level Minutes patient could stand   Performance level 60 without pain   Current Functional Level Minutes patient can stand   Performance level 30 while cooking with some pain in left leg   STG Target Performance Minutes patient will be able to stand   Performance level 30 with work in the kitchen with no leg pain   Rationale for meal preparation   Due date 06/27/22   Date Goal Met 07/01/22   LTGS Target Performance Hours patient will be able to stand   Performance Level 1 in the kitchen without pain   Rationale for meal preparation   Due date 07/27/22         Therapy Frequency:  once a month  Predicted Duration of Therapy Intervention:  2 months    Anat Cherry PT                 I CERTIFY THE NEED FOR THESE SERVICES FURNISHED UNDER        THIS PLAN OF TREATMENT AND WHILE UNDER MY CARE .             Physician Signature               Date    X_____________________________________________________                         Certification Date From:  06/23/22   Certification Date To:  09/19/22    Referring Provider:  Rosita Wetzel    Initial Assessment        See Epic Evaluation SOC Date: 03/25/22                                                                     Therapy Frequency:  once a month  Predicted Duration of Therapy Intervention:  2 months    Anat Cherry PT                 I CERTIFY THE NEED FOR THESE SERVICES FURNISHED UNDER        THIS PLAN OF TREATMENT AND WHILE UNDER MY CARE .             Physician Signature               Date    X_____________________________________________________                         Certification Date From:  06/23/22   Certification Date To:  09/19/22    Referring Provider:  Rosita GARCIA  Rashard    Initial Assessment        See Epic Evaluation SOC Date: 03/25/22                                                                                                            [facility]    OUTPATIENT Physical Therapy ORTHOPEDIC EVALUATION  PLAN OF TREATMENT FOR OUTPATIENT REHABILITATION  (COMPLETE FOR INITIAL CLAIMS ONLY)  Patient's Last Name, First Name, M.I.  YOB: 1950  Monica Edmonds    Provider s Name:  [facility]   Medical Record No.  [id number]   Start of Care Date:  03/25/22   Onset Date:    (3/16/2022, patient saw MD for back pain)   Type:     _X__PT   ___OT Medical Diagnosis:    Encounter Diagnosis   Name Primary?     Bilateral low back pain with left-sided sciatica Yes        Treatment Diagnosis:  back pain        Goals:     07/01/22 0500   Body Part   Goals listed below are for back   Goal #1   Goal #1 sitting   Previous Functional Level No restrictions   Current Functional Level Minutes patient can sit   Performance level 30 before onset of back and left leg pain   STG Target Performance Minutes patient will be able to sit   Performance level 30 wtih no c/o leg pain   Rationale to allow rest from standing;for community transportation  (computer work at home)   Due date 04/25/22   Date Goal Met 04/14/22   LTG Target Performance Minutes patient will be able to sit   Performance Level 60 without c/o leg or back pain   Rationale to allow rest from standing;for community transportation   Due date 05/25/22   Date Goal Met 05/26/22   Goal #2   Goal #2 standing   Previous Functional Level Minutes patient could stand   Performance level 60 without pain   Current Functional Level Minutes patient can stand   Performance level 30 while cooking with some pain in left leg   STG Target Performance Minutes patient will be able to stand   Performance level 30 with work in the kitchen with no leg pain   Rationale for meal preparation    Due date 06/27/22   Date Goal Met 07/01/22   LTG Target Performance Hours patient will be able to stand   Performance Level 1 in the kitchen without pain   Rationale for meal preparation   Due date 07/27/22       Therapy Frequency:  once a month  Predicted Duration of Therapy Intervention:  2 months    Anat Cherry, PT                 I CERTIFY THE NEED FOR THESE SERVICES FURNISHED UNDER        THIS PLAN OF TREATMENT AND WHILE UNDER MY CARE     (Physician attestation of this document indicates review and certification of the therapy plan).                     Certification Date From:  06/23/22   Certification Date To:  09/19/22    Referring Provider:  Rosita Wetzel    Initial Assessment        See Epic Evaluation SOC Date: 03/25/22

## 2022-07-01 NOTE — PROGRESS NOTES
Subjective:  HPI  Physical Exam  Oswestry Score: 24 %                 Objective:  System    Physical Exam    General     ROS    Assessment/Plan:    PROGRESS  REPORT    Progress reporting period is from 3/25/51422 to 7/1/2022.       SUBJECTIVE  Subjective changes noted by patient:  .  Subjective: Clicking is less.  Has some clicking with weeding and bending forward.  Just started biking outside.  Trying to keep RPM's up high.Still feels some tension in left foot.    Current pain level is 1/10  .     Previous pain level was   Initial Pain level: 3/10.   Changes in function:  Yes (See Goal flowsheet attached for changes in current functional level)  Adverse reaction to treatment or activity: None    OBJECTIVE  Changes noted in objective findings:  Yes,   Objective: Discussed cycling rpm's and relaxed cycling.  Just got a rack for her car.No pain today with all strengthening exercise today.  Reviewed if get pain in foot, try either flexion or extension to determine what helps pain the most.     ASSESSMENT/PLAN  Updated problem list and treatment plan: Diagnosis 1:  Back pain  Pain -  manual therapy, self management, education and home program  Decreased ROM/flexibility - manual therapy, therapeutic exercise and home program  Decreased strength - therapeutic exercise, therapeutic activities and home program  Decreased function - therapeutic activities and home program  STG/LTGs have been met or progress has been made towards goals:  Yes (See Goal flow sheet completed today.)  Assessment of Progress: The patient's condition is improving.  The patient's condition has potential to improve.  Self Management Plans:  Patient has been instructed in a home treatment program.  I have re-evaluated this patient and find that the nature, scope, duration and intensity of the therapy is appropriate for the medical condition of the patient.  Monica continues to require the following intervention to meet STG and LTG's:   PT    Recommendations:  This patient would benefit from continued therapy.     Frequency:  once X a month, once daily  Duration:  for 2 months        Please refer to the daily flowsheet for treatment today, total treatment time and time spent performing 1:1 timed codes.

## 2022-11-16 ENCOUNTER — THERAPY VISIT (OUTPATIENT)
Dept: PHYSICAL THERAPY | Facility: CLINIC | Age: 72
End: 2022-11-16
Payer: MEDICARE

## 2022-11-16 ENCOUNTER — MEDICAL CORRESPONDENCE (OUTPATIENT)
Dept: PHYSICAL THERAPY | Facility: CLINIC | Age: 72
End: 2022-11-16

## 2022-11-16 DIAGNOSIS — M54.42 BILATERAL LOW BACK PAIN WITH LEFT-SIDED SCIATICA, UNSPECIFIED CHRONICITY: Primary | ICD-10-CM

## 2022-11-16 PROCEDURE — 97110 THERAPEUTIC EXERCISES: CPT | Mod: GP | Performed by: PHYSICAL THERAPIST

## 2022-11-16 PROCEDURE — 97112 NEUROMUSCULAR REEDUCATION: CPT | Mod: GP | Performed by: PHYSICAL THERAPIST

## 2022-11-16 NOTE — PROGRESS NOTES
Subjective:  HPI  Physical Exam                    Objective:  System    Physical Exam    General     ROS    Assessment/Plan:    PROGRESS  REPORT    Progress reporting period is from 7/1/2022 to 11/16/2022.       SUBJECTIVE  Subjective changes noted by patient:  .  Subjective: Doing a lot better with controlling the nerve tension on the left side.  If gets leg pain, and not sure what causes it, is able to get rid of leg pain.  Still notes some clicking in low back.  Has been lap swimming, a water [employer] [employer], and walked on the track.  Also did gyro kinesis class.  [place] ok during the class.  Still doing PT exercises 3 times a week.  Is going to see a .    Current pain level is 1/10  .     Previous pain level was   Initial Pain level: 3/10.   Changes in function:  Yes (See Goal flowsheet attached for changes in current functional level)  Adverse reaction to treatment or activity: None    OBJECTIVE  Changes noted in objective findings:  Yes,   Objective: Some clicking in lower back with forward bend today and with all fours positioning.  No pain with this.  No pain with seated flexion or standing extension.  Clicking and some pain with side bending  Better with verbal cues to keep back straight and engage abdominal muscles for support of back.  Reviewed and modified weight lifting positions so no back pain.  Limited right hip active and passive flexion and external rotation.     ASSESSMENT/PLAN  Updated problem list and treatment plan: Diagnosis 1:  Right back and hip pain  Pain -  self management, education and home program  Decreased ROM/flexibility - manual therapy, therapeutic exercise and home program  Decreased joint mobility - manual therapy, therapeutic exercise and home program  Decreased strength - therapeutic exercise, therapeutic activities and home program  Decreased function - therapeutic activities and home program  STG/LTGs have been met or progress has been made towards goals:  Yes (See Goal flow  sheet completed today.)  Assessment of Progress: The patient's condition is improving.  The patient's condition has potential to improve.  Self Management Plans:  Patient has been instructed in a home treatment program.  I have re-evaluated this patient and find that the nature, scope, duration and intensity of the therapy is appropriate for the medical condition of the patient.  Monica continues to require the following intervention to meet STG and LTG's:  PT    Recommendations:  This patient would benefit from continued therapy.     Frequency:  1 X a month, once daily  Duration:  for 1 months        Please refer to the daily flowsheet for treatment today, total treatment time and time spent performing 1:1 timed codes.

## 2022-11-16 NOTE — PROGRESS NOTES
[facility]    OUTPATIENT Physical Therapy ORTHOPEDIC EVALUATION  PLAN OF TREATMENT FOR OUTPATIENT REHABILITATION  (COMPLETE FOR INITIAL CLAIMS ONLY)  Patient's Last Name, First Name, M.I.  YOB: 1950  Monica Edmonds    Provider s Name:  [facility]   Medical Record No.  [id number]   Start of Care Date:  03/25/22   Onset Date:    (3/16/2022, patient saw MD for back pain)   Treatment Diagnosis:  back pain Medical Diagnosis:  Bilateral low back pain with left-sided sciatica, unspecified chronicity       Goals:     11/16/22 0500   Body Part   Goals listed below are for back   Goal #1   Goal #1 sitting   Previous Functional Level No restrictions   Current Functional Level Minutes patient can sit   Performance level 30 before onset of back and left leg pain   STG Target Performance Minutes patient will be able to sit   Performance level 30 wtih no c/o leg pain   Rationale to allow rest from standing;for community transportation  (computer work at home)   Due date 04/25/22   Date Goal Met 04/14/22   LTG Target Performance Minutes patient will be able to sit   Performance Level 60 without c/o leg or back pain   Rationale to allow rest from standing;for community transportation   Due date 05/25/22   Date Goal Met 05/26/22   Goal #2   Goal #2 standing   Previous Functional Level Minutes patient could stand   Performance level 60 without pain   Current Functional Level Minutes patient can stand   Performance level 30 while cooking with some pain in left leg   STG Target Performance Minutes patient will be able to stand   Performance level 30 with work in the kitchen with no leg pain   Rationale for meal preparation   Due date 06/27/22   Date Goal Met 07/01/22   LTG Target Performance Hours patient will be able to stand   Performance Level 1 in the kitchen without pain    Rationale for meal preparation   Due date 07/27/22   Date Goal Met 11/16/22   Goal #3   Goal #3 lifting/carrying   Previous Functional Level Could lift objects weighing   Performance level #20   LTG Target Performance Lift an item off floor to seat height weighing   Performance Level weighing #20 without back pain   Rationale for housework such as laundry, emptying garbage, use of    Due date 12/16/22       Therapy Frequency:  once a month  Predicted Duration of Therapy Intervention:  one month    Anat Cherry PT                 I CERTIFY THE NEED FOR THESE SERVICES FURNISHED UNDER        THIS PLAN OF TREATMENT AND WHILE UNDER MY CARE .             Physician Signature               Date    X_____________________________________________________                         Certification Date From:  09/20/22   Certification Date To:  12/18/22    Referring Provider:  Rosita Wetzel    Initial Assessment        See Epic Evaluation SOC Date: 03/25/22